# Patient Record
Sex: MALE | Race: BLACK OR AFRICAN AMERICAN | Employment: OTHER
[De-identification: names, ages, dates, MRNs, and addresses within clinical notes are randomized per-mention and may not be internally consistent; named-entity substitution may affect disease eponyms.]

---

## 2023-07-31 ENCOUNTER — HOSPITAL ENCOUNTER (EMERGENCY)
Facility: HOSPITAL | Age: 58
Discharge: HOME | End: 2023-07-31
Attending: EMERGENCY MEDICINE
Payer: COMMERCIAL

## 2023-07-31 ENCOUNTER — APPOINTMENT (OUTPATIENT)
Facility: HOSPITAL | Age: 58
End: 2023-07-31
Payer: COMMERCIAL

## 2023-07-31 VITALS
HEIGHT: 71 IN | DIASTOLIC BLOOD PRESSURE: 94 MMHG | RESPIRATION RATE: 16 BRPM | SYSTOLIC BLOOD PRESSURE: 128 MMHG | WEIGHT: 150 LBS | TEMPERATURE: 97.6 F | OXYGEN SATURATION: 98 % | HEART RATE: 73 BPM | BODY MASS INDEX: 21 KG/M2

## 2023-07-31 DIAGNOSIS — M25.532 LEFT WRIST PAIN: Primary | ICD-10-CM

## 2023-07-31 PROCEDURE — 73110 X-RAY EXAM OF WRIST: CPT

## 2023-07-31 PROCEDURE — 99283 EMERGENCY DEPT VISIT LOW MDM: CPT

## 2023-07-31 PROCEDURE — 6370000000 HC RX 637 (ALT 250 FOR IP): Performed by: PHYSICIAN ASSISTANT

## 2023-07-31 RX ORDER — ACETAMINOPHEN 500 MG
1000 TABLET ORAL EVERY 6 HOURS PRN
Qty: 56 TABLET | Refills: 0 | Status: SHIPPED | OUTPATIENT
Start: 2023-07-31 | End: 2023-08-07

## 2023-07-31 RX ORDER — ACETAMINOPHEN 325 MG/1
650 TABLET ORAL
Status: COMPLETED | OUTPATIENT
Start: 2023-07-31 | End: 2023-07-31

## 2023-07-31 RX ADMIN — ACETAMINOPHEN 325MG 650 MG: 325 TABLET ORAL at 08:50

## 2023-07-31 ASSESSMENT — ENCOUNTER SYMPTOMS
DIARRHEA: 0
VOMITING: 0
NAUSEA: 0
ABDOMINAL PAIN: 0
SHORTNESS OF BREATH: 0

## 2023-07-31 ASSESSMENT — PAIN SCALES - GENERAL: PAINLEVEL_OUTOF10: 7

## 2023-07-31 ASSESSMENT — PAIN DESCRIPTION - ORIENTATION: ORIENTATION: RIGHT

## 2023-07-31 ASSESSMENT — PAIN DESCRIPTION - LOCATION: LOCATION: WRIST

## 2023-07-31 NOTE — ED PROVIDER NOTES
concerns. Velcro wrist splint applied by nurse. Provider Notes (Medical Decision Making): 58-year-old male who presents to the ED due to left wrist pain after injury that occurred yesterday. No ecchymosis, edema, deformity. Extremity neurovascularly intact. X-ray negative for acute process. Patient is stable for discharge with velcro wrist splint, symptomatic management, and close outpatient follow-up for further assessment. Strict return precautions provided. Diagnosis     Clinical Impression:   1. Left wrist pain        Disposition: home      [facility] EMERGENCY DEPT  [address] [facility]  [phone]    If symptoms worsen    Lorraine Vega MD  [address]  [place] [address]  [phone]    Schedule an appointment as soon as possible for a visit       [address] [facility] [address] [facility] - [address]  [phone]  Schedule an appointment as soon as possible for a visit             Medication List        START taking these medications      acetaminophen 500 MG tablet  Commonly known as: TYLENOL  Take 2 tablets by mouth every 6 hours as needed for Pain               Where to Get Your Medications        These medications were sent to [address], [address]  [address]      Phone: [phone]   acetaminophen 500 MG tablet          Dictation disclaimer:  Please note that this dictation was completed with [employer], the computer voice recognition software. Quite often unanticipated grammatical, syntax, homophones, and other interpretive errors are inadvertently transcribed by the computer software. Please disregard these errors. Please excuse any errors that have escaped final proofreading.          Liana Wiggins  07/31/23 1525

## 2023-07-31 NOTE — ED NOTES
Received patient in triage with c/o left wrist pain. Pt stated that he was wrestling with his son yesterday when he noticed the pain.  Mild swelling noted to left wrist.      Suzi Lopez RN  07/31/23 5988

## 2023-10-18 ENCOUNTER — HOSPITAL ENCOUNTER (EMERGENCY)
Facility: HOSPITAL | Age: 58
Discharge: HOME | End: 2023-10-18
Payer: MEDICAID

## 2023-10-18 VITALS
OXYGEN SATURATION: 99 % | HEIGHT: 71 IN | RESPIRATION RATE: 18 BRPM | DIASTOLIC BLOOD PRESSURE: 99 MMHG | TEMPERATURE: 98.2 F | SYSTOLIC BLOOD PRESSURE: 179 MMHG | HEART RATE: 77 BPM | WEIGHT: 150 LBS | BODY MASS INDEX: 21 KG/M2

## 2023-10-18 DIAGNOSIS — G44.219 EPISODIC TENSION-TYPE HEADACHE, NOT INTRACTABLE: Primary | ICD-10-CM

## 2023-10-18 DIAGNOSIS — Z76.0 ENCOUNTER FOR MEDICATION REFILL: ICD-10-CM

## 2023-10-18 PROCEDURE — 99283 EMERGENCY DEPT VISIT LOW MDM: CPT

## 2023-10-18 RX ORDER — AMLODIPINE BESYLATE 10 MG/1
10 TABLET ORAL DAILY
Qty: 90 TABLET | Refills: 0 | Status: SHIPPED | OUTPATIENT
Start: 2023-10-18

## 2023-10-18 ASSESSMENT — ENCOUNTER SYMPTOMS
ABDOMINAL PAIN: 0
ABDOMINAL DISTENTION: 0
DIARRHEA: 0
VOMITING: 0
RHINORRHEA: 0
NAUSEA: 0

## 2023-10-18 ASSESSMENT — PAIN - FUNCTIONAL ASSESSMENT: PAIN_FUNCTIONAL_ASSESSMENT: 0-10

## 2023-10-18 ASSESSMENT — PAIN SCALES - GENERAL: PAINLEVEL_OUTOF10: 6

## 2023-10-18 ASSESSMENT — PAIN DESCRIPTION - LOCATION: LOCATION: HEAD

## 2023-10-18 NOTE — ED NOTES
Patient refuses blood draw at this time. Endorses he does not understand why he needs blood work for a headache. Greer TURCIOS aware.       Lobo Velasco RN  10/18/23 [id number]

## 2023-10-18 NOTE — ED PROVIDER NOTES
EMERGENCY DEPARTMENT HISTORY AND PHYSICAL EXAM      Patient Name: Sommer Staples  MRN: [id number]  YOB: 1965  Provider: Sadaf Ramirez PA-C  PCP: López Harper MD   Time/Date of evaluation: 1:24 PM EDT on 10/18/23    History of Presenting Illness     Chief Complaint   Patient presents with    Headache       History Provided By: Patient     History Gaynel Stu): Sommer Staples is a 59 y.o. male with a PMHX of carotid artery aneurysm. who presents to the emergency department  by POV C/O of headache onset this morning on the left-hand side. Patient states that he has been worked up multiple times for his carotid artery aneurysm, and does not want any other further imaging or testing at this time, he states that he has not been on his blood pressure medications for the past 2 months, and is requesting a refill. Patient denies any slurred speech, numbness, weakness, blurred vision      Past History     Past Medical History:  No past medical history on file. Past Surgical History:  No past surgical history on file. Family History:  No family history on file. Social History:       Medications:  No current facility-administered medications for this encounter. Current Outpatient Medications   Medication Sig Dispense Refill    amLODIPine (NORVASC) 10 MG tablet Take 1 tablet by mouth daily 90 tablet 0    acetaminophen (TYLENOL) 500 MG tablet Take 2 tablets by mouth every 6 hours as needed for Pain 56 tablet 0       Allergies:   Allergies   Allergen Reactions    Penicillins        Social Determinants of Health:  Social Determinants of Health     Tobacco Use: Not on file   Alcohol Use: Not on file   Financial Resource Strain: Not on file   Food Insecurity: Not on file   Transportation Needs: Not on file   Physical Activity: Not on file   Stress: Not on file   Social Connections: Not on file   Intimate Partner Violence: Not on file   Depression: Not on file   Housing

## 2023-10-18 NOTE — DISCHARGE INSTRUCTIONS
Please follow-up closely with primary care physician, and on strict return precautions please return to the ED immediately if worsening or new development of symptoms.

## 2023-10-18 NOTE — ED TRIAGE NOTES
Pt complaining of headache started few weeks ago worsens today ,and also he said that he wasn't able to take his bp meds x2 months .